# Patient Record
Sex: MALE | Race: WHITE | NOT HISPANIC OR LATINO | Employment: FULL TIME | URBAN - METROPOLITAN AREA
[De-identification: names, ages, dates, MRNs, and addresses within clinical notes are randomized per-mention and may not be internally consistent; named-entity substitution may affect disease eponyms.]

---

## 2018-05-31 ENCOUNTER — OFFICE VISIT - HEALTHEAST (OUTPATIENT)
Dept: FAMILY MEDICINE | Facility: CLINIC | Age: 43
End: 2018-05-31

## 2018-05-31 DIAGNOSIS — Z00.00 ROUTINE GENERAL MEDICAL EXAMINATION AT A HEALTH CARE FACILITY: ICD-10-CM

## 2018-05-31 DIAGNOSIS — D64.9 ANEMIA, UNSPECIFIED TYPE: ICD-10-CM

## 2018-05-31 DIAGNOSIS — K90.0 CELIAC DISEASE: ICD-10-CM

## 2018-05-31 DIAGNOSIS — I49.1 SUPRAVENTRICULAR PREMATURE BEATS: ICD-10-CM

## 2018-05-31 LAB
CHOLEST SERPL-MCNC: 223 MG/DL
ERYTHROCYTE [DISTWIDTH] IN BLOOD BY AUTOMATED COUNT: 12.4 % (ref 11–14.5)
FASTING STATUS PATIENT QL REPORTED: YES
FASTING STATUS PATIENT QL REPORTED: YES
GLUCOSE BLD-MCNC: 81 MG/DL (ref 70–125)
HCT VFR BLD AUTO: 41.8 % (ref 40–54)
HDLC SERPL-MCNC: 45 MG/DL
HGB BLD-MCNC: 14.1 G/DL (ref 14–18)
LDLC SERPL CALC-MCNC: 155 MG/DL
MCH RBC QN AUTO: 27.3 PG (ref 27–34)
MCHC RBC AUTO-ENTMCNC: 33.7 G/DL (ref 32–36)
MCV RBC AUTO: 81 FL (ref 80–100)
PLATELET # BLD AUTO: 219 THOU/UL (ref 140–440)
PMV BLD AUTO: 7.2 FL (ref 7–10)
RBC # BLD AUTO: 5.17 MILL/UL (ref 4.4–6.2)
TRIGL SERPL-MCNC: 114 MG/DL
WBC: 4.5 THOU/UL (ref 4–11)

## 2018-05-31 ASSESSMENT — MIFFLIN-ST. JEOR: SCORE: 1781.72

## 2018-06-04 LAB
GLIADIN IGA SER-ACNC: 2.5 U/ML
GLIADIN IGG SER-ACNC: 0.4 U/ML
IGA SERPL-MCNC: 299 MG/DL (ref 65–400)
TTG IGA SER-ACNC: 0.3 U/ML
TTG IGG SER-ACNC: <0.6 U/ML

## 2018-06-12 ENCOUNTER — COMMUNICATION - HEALTHEAST (OUTPATIENT)
Dept: FAMILY MEDICINE | Facility: CLINIC | Age: 43
End: 2018-06-12

## 2018-10-02 ENCOUNTER — COMMUNICATION - HEALTHEAST (OUTPATIENT)
Dept: FAMILY MEDICINE | Facility: CLINIC | Age: 43
End: 2018-10-02

## 2018-10-19 ENCOUNTER — RECORDS - HEALTHEAST (OUTPATIENT)
Dept: ADMINISTRATIVE | Facility: OTHER | Age: 43
End: 2018-10-19

## 2019-06-06 ENCOUNTER — OFFICE VISIT - HEALTHEAST (OUTPATIENT)
Dept: FAMILY MEDICINE | Facility: CLINIC | Age: 44
End: 2019-06-06

## 2019-06-06 DIAGNOSIS — N48.30 PAINFUL PENILE ERECTION: ICD-10-CM

## 2019-06-06 DIAGNOSIS — E78.00 HYPERCHOLESTEROLEMIA: ICD-10-CM

## 2019-06-06 DIAGNOSIS — D64.9 ANEMIA, UNSPECIFIED TYPE: ICD-10-CM

## 2019-06-06 DIAGNOSIS — Z00.00 ROUTINE GENERAL MEDICAL EXAMINATION AT A HEALTH CARE FACILITY: ICD-10-CM

## 2019-06-06 LAB
CHOLEST SERPL-MCNC: 191 MG/DL
FASTING STATUS PATIENT QL REPORTED: YES
HDLC SERPL-MCNC: 45 MG/DL
HGB BLD-MCNC: 13.8 G/DL (ref 14–18)
LDLC SERPL CALC-MCNC: 134 MG/DL
TRIGL SERPL-MCNC: 61 MG/DL

## 2019-06-06 ASSESSMENT — MIFFLIN-ST. JEOR: SCORE: 1750.81

## 2020-10-13 ENCOUNTER — RECORDS - HEALTHEAST (OUTPATIENT)
Dept: ADMINISTRATIVE | Facility: OTHER | Age: 45
End: 2020-10-13

## 2020-10-19 ENCOUNTER — COMMUNICATION - HEALTHEAST (OUTPATIENT)
Dept: FAMILY MEDICINE | Facility: CLINIC | Age: 45
End: 2020-10-19

## 2020-10-19 ENCOUNTER — OFFICE VISIT - HEALTHEAST (OUTPATIENT)
Dept: FAMILY MEDICINE | Facility: CLINIC | Age: 45
End: 2020-10-19

## 2020-10-19 DIAGNOSIS — D22.4 ATYPICAL NEVUS OF NECK: ICD-10-CM

## 2020-10-19 DIAGNOSIS — M76.62 ACHILLES TENDINITIS OF BOTH LOWER EXTREMITIES: ICD-10-CM

## 2020-10-19 DIAGNOSIS — M76.61 ACHILLES TENDINITIS OF BOTH LOWER EXTREMITIES: ICD-10-CM

## 2020-10-19 DIAGNOSIS — D64.9 ANEMIA, UNSPECIFIED TYPE: ICD-10-CM

## 2020-10-19 DIAGNOSIS — Z00.00 ROUTINE GENERAL MEDICAL EXAMINATION AT A HEALTH CARE FACILITY: ICD-10-CM

## 2020-10-19 DIAGNOSIS — H81.10 BENIGN PAROXYSMAL POSITIONAL VERTIGO, UNSPECIFIED LATERALITY: ICD-10-CM

## 2020-10-19 DIAGNOSIS — K90.0 CELIAC DISEASE: ICD-10-CM

## 2020-10-19 LAB
CHOLEST SERPL-MCNC: 212 MG/DL
FASTING STATUS PATIENT QL REPORTED: YES
HDLC SERPL-MCNC: 44 MG/DL
HGB BLD-MCNC: 14.2 G/DL (ref 14–18)
HIV 1+2 AB+HIV1 P24 AG SERPL QL IA: NEGATIVE
LDLC SERPL CALC-MCNC: 148 MG/DL
TRIGL SERPL-MCNC: 99 MG/DL

## 2020-10-19 ASSESSMENT — MIFFLIN-ST. JEOR: SCORE: 1794.76

## 2021-01-15 ENCOUNTER — RECORDS - HEALTHEAST (OUTPATIENT)
Dept: ADMINISTRATIVE | Facility: OTHER | Age: 46
End: 2021-01-15

## 2021-02-01 ENCOUNTER — COMMUNICATION - HEALTHEAST (OUTPATIENT)
Dept: FAMILY MEDICINE | Facility: CLINIC | Age: 46
End: 2021-02-01
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
## 2021-06-01 VITALS — HEIGHT: 72 IN | WEIGHT: 189.31 LBS | BODY MASS INDEX: 25.64 KG/M2

## 2021-06-02 VITALS — HEIGHT: 72 IN | BODY MASS INDEX: 24.72 KG/M2 | WEIGHT: 182.5 LBS

## 2021-06-04 VITALS
BODY MASS INDEX: 26.03 KG/M2 | OXYGEN SATURATION: 99 % | WEIGHT: 192.19 LBS | SYSTOLIC BLOOD PRESSURE: 110 MMHG | HEART RATE: 76 BPM | DIASTOLIC BLOOD PRESSURE: 70 MMHG | TEMPERATURE: 97.6 F | HEIGHT: 72 IN
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
## 2021-06-12 NOTE — PATIENT INSTRUCTIONS - HE
Consider adding a heel lift in your shoes to help reduce pressure on your Achilles tendon insertion on your heel.  Do some gentle stretches of your Achilles tendon twice daily.  Consider a new pair of supportive shoes.  Consider physical therapy.  Let us know if not improving.    I recommend scheduling a visit with dermatology.  Check to see who is covered in your insurance network.  I often refer to [facility].

## 2021-06-18 NOTE — PROGRESS NOTES
Assessment/Plan:     1. Routine general medical examination at a health care facility  Encouraged healthy lifestyle habits including regular exercise, healthy eating habits, and adequate calcium and vitamin D intake.  Will obtain fasting glucose and fasting lipids.  Up-to-date with routine vaccinations.  He is not due for any cancer screening.  - Glucose  - Lipid Cascade FASTING    2. Gluten Enteropathy  Advised moderation of gluten.  Consider complete elimination diet.  Will check hemoglobin.  - HM2(CBC w/o Differential)  - Celiac(Gluten)Antibody Panel ($$$)    3. Atrial Premature Complex  Rare symptoms, doing well.    4. Anemia, unspecified type  We will obtain follow-up hemoglobin today.  - HM2(CBC w/o Differential)         Subjective:     Sandoval Amaro is a 42 y.o. male who presents for an annual exam.  He has been doing well over the past year.  He notes a history of food related allergies and would like to discuss it.  Notes that if he has any dairy he gets an upset stomach or diarrhea, primarily occurs with milk and ice cream, able to tolerate yogurt well most of the time.  Notes that Weller peppers tend to cause lip swelling and hives.  Gluten, especially more than one slice of bread or more than 1 beer or any Posta, tends to cause significant pain in his get that radiates into the back of his legs.  Response can be variable.  He is wondering if additional testing for any of these would be helpful.  We have noted chronic slight anemia in the past.  He has never been screened for gluten enteropathy with blood tests and is interested in doing so.  He has not had any symptoms from previous premature atrial complexes.  This is been inactive.    His father has struggled over the past year with C. difficile colitis, is doing a bit better now but this was a struggle for patient and his family.    He is , 2 children ages 6 and 8.  Exercising by biking and walking his dogs.  Overall feels his diet is  healthy.      Healthy Habits:   Healthy Diet: Yes  Regular Exercise: Yes  Sunscreen Use: Yes  Dental Visits Regularly: Yes  Seat Belt: Yes    Health Maintenance reviewed:  Lipid Profile: needs  Glucose Screen: needs  Colonoscopy: N/A      Immunization History   Administered Date(s) Administered     Hep B, historic 06/17/2009, 05/11/2011     Td,adult,historic,unspecified 01/07/2009     Tdap 01/07/2009     Immunization status: up to date and documented.    No current outpatient prescriptions on file.     No current facility-administered medications for this visit.      No past medical history on file.  No past surgical history on file.  Green pepper and Erythromycin base  Family History   Problem Relation Age of Onset     Hyperlipidemia Mother      Arthritis Mother      Skin cancer Maternal Grandmother      Cancer Maternal Grandmother      skin     Lung disease Maternal Grandfather      asbestosis     Heart disease Paternal Grandmother      Alcohol abuse Paternal Grandfather      Diabetes Neg Hx      Social History     Social History     Marital status:      Spouse name: N/A     Number of children: N/A     Years of education: N/A     Occupational History     Not on file.     Social History Main Topics     Smoking status: Never Smoker     Smokeless tobacco: Never Used     Alcohol use 0.6 - 1.8 oz/week     1 - 3 Cans of beer per week     Drug use: No     Sexual activity: Not on file     Other Topics Concern     Not on file     Social History Narrative       Review of Systems  General:  Denies problem  Eyes: Denies problem  Ears/Nose/Throat: Denies problem  Cardiovascular: Denies problem  Respiratory:  Denies problem  Gastrointestinal:  Denies problem   Genitourinary: Denies problem  Musculoskeletal:  Denies problem  Skin: Denies problem  Neurologic: Denies problem  Psychiatric: Denies problem  Endocrine: Denies problem  Heme/Lymphatic: Denies problem   Allergic/Immunologic: Denies problem            Objective:         Vitals:    05/31/18 0713   BP: 110/70   Pulse: 82   Temp: 98.2  F (36.8  C)   TempSrc: Oral   SpO2: 99%   Weight: 189 lb 5 oz (85.9 kg)   Height: 6' (1.829 m)     Body mass index is 25.68 kg/(m^2).    Physical Exam:  General Appearance: Alert, pleasant, appears stated age  Head: Normocephalic, without obvious abnormality  Eyes: PERRL, conjunctiva/corneas clear, EOM's intact  Ears: Normal TM's and external ear canals, both ears  Nose: Nares normal, septum midline,mucosa normal, no drainage  Throat: Lips, mucosa, and tongue normal; teeth and gums normal; oropharynx is clear  Neck: Supple,without lymphadenopathy or thyromegally  Lungs: Clear to auscultation bilaterally, respirations unlabored  Heart: Regular rate and rhythm, no murmur   Abdomen: Soft, non-tender, no masses, no organomegaly  Genitourinary: Testes of normal size without masses, no inguinal hernias  Extremities: Extremities with strong and symmetric pulses, no cyanosis or edema  Skin: Skin color, texture normal, no rashes or lesions  Neurologic: Normal              This note has been dictated using voice recognition software. Any grammatical or context distortions are unintentional and inherent to the the software.

## 2021-09-25 ENCOUNTER — HEALTH MAINTENANCE LETTER (OUTPATIENT)
Age: 46
End: 2021-09-25

## 2021-11-03 ENCOUNTER — OFFICE VISIT (OUTPATIENT)
Dept: FAMILY MEDICINE | Facility: CLINIC | Age: 46
End: 2021-11-03
Payer: COMMERCIAL

## 2021-11-03 VITALS
DIASTOLIC BLOOD PRESSURE: 70 MMHG | SYSTOLIC BLOOD PRESSURE: 110 MMHG | HEIGHT: 72 IN | HEART RATE: 67 BPM | OXYGEN SATURATION: 98 % | RESPIRATION RATE: 16 BRPM | WEIGHT: 190.6 LBS | BODY MASS INDEX: 25.81 KG/M2 | TEMPERATURE: 98.2 F

## 2021-11-03 DIAGNOSIS — D64.9 ANEMIA, UNSPECIFIED TYPE: ICD-10-CM

## 2021-11-03 DIAGNOSIS — K90.0 CELIAC DISEASE: ICD-10-CM

## 2021-11-03 DIAGNOSIS — Z00.00 ROUTINE PHYSICAL EXAMINATION: Primary | ICD-10-CM

## 2021-11-03 PROCEDURE — 99396 PREV VISIT EST AGE 40-64: CPT | Performed by: FAMILY MEDICINE

## 2021-11-03 ASSESSMENT — MIFFLIN-ST. JEOR: SCORE: 1782.56

## 2021-11-03 NOTE — PROGRESS NOTES
Assessment/Plan:     Routine physical examination  Encouraged healthy lifestyle habits including regular exercise, healthy eating habits, and adequate calcium and vitamin D intake.  He is already received influenza vaccine.  Is not yet eligible for third dose of Covid vaccine.  Information provided regarding advance healthcare directives.  Lipids looked okay last year, not fasting today, will defer until next year.  No risk factors for diabetes.    Celiac disease  Encouraged continued gluten-free diet.  Consider probiotic such as Culturelle or Florastor.    Anemia, unspecified type  This remains asymptomatic.  He is getting a good variety in his diet, is not identifying any blood loss, lightheadedness, energy level changes, therefore we elect to defer reassessment of this until next year.    There are no Patient Instructions on file for this visit.     Return in about 1 year (around 11/3/2022) for Annual Preventive Care Visit.         Subjective:     Sandoval Amaro is a 46 year old male who presents for an annual exam.     He has been doing well over the past year with no changes in his health.  He has been strict with gluten-free and lactose-free diet over the last 5 weeks, not yet noting improvement in his gut health except from avoidance of lactose which he has been doing for several years now.  History of anemia thought to be related to celiac, denies any new fatigue, lightheadedness, or other new symptoms.  Denies nocturia, hesitancy, or incomplete bladder emptying.  Exercising regularly with rowing machine.  Sleeping well.  Eating healthy.  He has been working from home and enjoying this.    Healthy Habits:     Getting at least 3 servings of Calcium per day:  Yes    Bi-annual eye exam:  Yes    Dental care twice a year:  Yes    Sleep apnea or symptoms of sleep apnea:  None    Diet:  Gluten-free/reduced and Other    Frequency of exercise:  6-7 days/week    Duration of exercise:  30-45 minutes    Taking  medications regularly:  Yes    Medication side effects:  Not applicable    PHQ-2 Total Score: 0    Additional concerns today:  No       Healthy Habits:   Healthy Diet: Yes  Regular Exercise: Yes  Sunscreen Use: No  Dental Visits Regularly: Yes  Seat Belt: Yes    Health Maintenance reviewed:  Lipid Profile: Overall okay with  last year  Glucose Screen: Not indicated  Colonoscopy: No risk factors, not yet indicated      Immunization History   Administered Date(s) Administered     COVID-19,PF,Pfizer (12+ Yrs) 04/05/2021, 05/03/2021     HepB-Adult 06/17/2009, 05/11/2011     Influenza Vaccine IM > 6 months Valent IIV4 (Alfuria,Fluzone) 10/24/2017, 10/18/2019     Influenza Vaccine, 6+MO IM (QUADRIVALENT W/PRESERVATIVES) 10/13/2020     Influenza,INJ,MDCK,PF,Quad >4yrs 10/21/2021     TD (ADULT, 7+) 06/06/2019     Td,adult,historic,unspecified 01/07/2009     Tdap (Adacel,Boostrix) 01/07/2009     Immunization status: Up-to-date    No current outpatient medications on file.     Past Medical History:   Diagnosis Date     NO ACTIVE PROBLEMS      Past Surgical History:   Procedure Laterality Date     LASIK  12/06     Green pepper [capsicum] and Erythromycin base [erythromycin]  Family History   Problem Relation Age of Onset     Neurologic Disorder Mother         Intermittent vertigo     Cancer Maternal Grandmother         skin     Respiratory Maternal Grandfather         Asbestos     Alcohol/Drug Paternal Grandfather         Alcohol     Hyperlipidemia Mother      Arthritis Mother      Skin Cancer Maternal Grandmother      LUNG DISEASE Maternal Grandfather         asbestosis     Heart Disease Paternal Grandmother      Alcoholism Paternal Grandfather      Atrial fibrillation Father      Atrial fibrillation Brother      Diabetes No family hx of      Social History     Socioeconomic History     Marital status:      Spouse name: Not on file     Number of children: Not on file     Years of education: Not on file      Highest education level: Not on file   Occupational History     Not on file   Tobacco Use     Smoking status: Never Smoker     Smokeless tobacco: Never Used   Substance and Sexual Activity     Alcohol use: Yes     Alcohol/week: 1.0 - 2.0 standard drinks     Drug use: No     Sexual activity: Yes     Partners: Female   Other Topics Concern     Not on file   Social History Narrative     Not on file     Social Determinants of Health     Financial Resource Strain:      Difficulty of Paying Living Expenses:    Food Insecurity:      Worried About Running Out of Food in the Last Year:      Ran Out of Food in the Last Year:    Transportation Needs:      Lack of Transportation (Medical):      Lack of Transportation (Non-Medical):    Physical Activity:      Days of Exercise per Week:      Minutes of Exercise per Session:    Stress:      Feeling of Stress :    Social Connections:      Frequency of Communication with Friends and Family:      Frequency of Social Gatherings with Friends and Family:      Attends Mandaen Services:      Active Member of Clubs or Organizations:      Attends Club or Organization Meetings:      Marital Status:    Intimate Partner Violence:      Fear of Current or Ex-Partner:      Emotionally Abused:      Physically Abused:      Sexually Abused:        Review of Systems  General:  Denies problem  Eyes: Denies problem  Ears/Nose/Throat: Denies problem  Cardiovascular: Denies problem  Respiratory:  Denies problem  Gastrointestinal:  Denies problem   Genitourinary: Denies problem  Musculoskeletal:  Denies problem  Skin: Denies problem  Neurologic: Denies problem  Psychiatric: Denies problem  Endocrine: Denies problem  Heme/Lymphatic: Denies problem   Allergic/Immunologic: Denies problem           Objective:        Vitals:    11/03/21 1114   BP: 110/70   Pulse: 67   Resp: 16   Temp: 98.2  F (36.8  C)   TempSrc: Oral   SpO2: 98%   Weight: 86.5 kg (190 lb 9.6 oz)   Height: 1.829 m (6')     Body mass index is  25.85 kg/m .    Physical Exam:  General Appearance: Alert, pleasant, appears stated age  Head: Normocephalic, without obvious abnormality  Eyes: PERRL, conjunctiva/corneas clear, EOM's intact  Ears: Normal TM's and external ear canals, both ears  Nose: Nares normal, septum midline,mucosa normal, no drainage  Throat: Lips, mucosa, and tongue normal; teeth and gums normal; oropharynx is clear  Neck: Supple,without lymphadenopathy or thyromegally  Lungs: Clear to auscultation bilaterally, respirations unlabored  Heart: Regular rate and rhythm, no murmur   Abdomen: Soft, non-tender, no masses, no organomegaly  Extremities: Extremities with strong and symmetric pulses, no cyanosis or edema  Skin: Skin color, texture normal, no rashes or lesions  Neurologic: Normal              This note has been dictated using voice recognition software. Any grammatical or context distortions are unintentional and inherent to the the software.

## 2021-11-03 NOTE — PATIENT INSTRUCTIONS
Consider a probiotic such as Culturelle, Florastor, or a health food store supplement that is gluten and lactose-free.

## 2022-11-09 ENCOUNTER — OFFICE VISIT (OUTPATIENT)
Dept: FAMILY MEDICINE | Facility: CLINIC | Age: 47
End: 2022-11-09
Payer: COMMERCIAL

## 2022-11-09 VITALS
TEMPERATURE: 98 F | OXYGEN SATURATION: 98 % | SYSTOLIC BLOOD PRESSURE: 98 MMHG | WEIGHT: 189.4 LBS | HEART RATE: 76 BPM | HEIGHT: 72 IN | DIASTOLIC BLOOD PRESSURE: 60 MMHG | BODY MASS INDEX: 25.65 KG/M2 | RESPIRATION RATE: 18 BRPM

## 2022-11-09 DIAGNOSIS — D64.9 ANEMIA, UNSPECIFIED TYPE: ICD-10-CM

## 2022-11-09 DIAGNOSIS — K90.0 CELIAC DISEASE: ICD-10-CM

## 2022-11-09 DIAGNOSIS — Z00.00 ROUTINE PHYSICAL EXAMINATION: Primary | ICD-10-CM

## 2022-11-09 DIAGNOSIS — Z12.11 SCREEN FOR COLON CANCER: ICD-10-CM

## 2022-11-09 PROCEDURE — 99396 PREV VISIT EST AGE 40-64: CPT | Performed by: FAMILY MEDICINE

## 2022-11-09 ASSESSMENT — ENCOUNTER SYMPTOMS
DIARRHEA: 0
PARESTHESIAS: 0
HEMATURIA: 0
ABDOMINAL PAIN: 0
FEVER: 0
NERVOUS/ANXIOUS: 0
WEAKNESS: 0
ARTHRALGIAS: 0
HEARTBURN: 0
NAUSEA: 0
SORE THROAT: 0
MYALGIAS: 0
CHILLS: 0
DYSURIA: 0
EYE PAIN: 0
JOINT SWELLING: 0
COUGH: 0
SHORTNESS OF BREATH: 0
PALPITATIONS: 0
HEMATOCHEZIA: 0
DIZZINESS: 0
CONSTIPATION: 0
FREQUENCY: 0
HEADACHES: 0

## 2022-11-09 ASSESSMENT — PAIN SCALES - GENERAL: PAINLEVEL: NO PAIN (0)

## 2022-11-09 NOTE — PROGRESS NOTES
Assessment/Plan:     Routine physical examination  Encouraged healthy lifestyle habits including regular exercise, healthy eating habits, and adequate calcium and vitamin D intake.  Up-to-date with immunizations including COVID by valent booster.  Consider hepatitis B vaccine series.  Discussed colon cancer screening, he is agreeable to Cologuard.    Celiac disease  He continues to avoid gluten with good effect.    Anemia, unspecified type  This is mild, likely related to celiac.  We elect not to recheck this today but will consider at future visit.    Screen for colon cancer  - COLOGUARD([employer] SCI[employer])     There are no Patient Instructions on file for this visit.     No follow-ups on file.         Subjective:     Sandoval Amaro is a 47 year old male who presents for an annual exam.     Doing well over the past year without changes in health.  Mostly gluten and lactose-free, sometimes will have chocolate and take Lactaid with it.  History of mild anemia related to this, he has not had symptoms of this.  He would like me to look at a mole on his right neck.    Eating healthy.  Exercising on a rowing machine for 20 minutes most days of the week, also walking his dog.  Adequate sleep.  Good stress management.    Healthy Habits:     Getting at least 3 servings of Calcium per day:  Yes    Bi-annual eye exam:  Yes    Dental care twice a year:  Yes    Sleep apnea or symptoms of sleep apnea:  None    Diet:  Gluten-free/reduced and Other    Frequency of exercise:  6-7 days/week    Duration of exercise:  30-45 minutes    Taking medications regularly:  Yes    Medication side effects:  Not applicable    PHQ-2 Total Score: 0    Additional concerns today:  Yes       Healthy Habits:   Healthy Diet: Yes  Regular Exercise: Yes  Sunscreen Use: Yes  Dental Visits Regularly: Yes  Seat Belt: Yes    Health Maintenance reviewed:  Colonoscopy: No previous      Immunization History   Administered Date(s) Administered      COVID-19,PF,Moderna 11/28/2021     COVID-19,PF,Pfizer (12+ Yrs) 04/05/2021, 05/03/2021     COVID-19,PF,Pfizer 12+ YRS BIVALENT Booster 09/30/2022     HepB-Adult 06/17/2009, 05/11/2011     Influenza Vaccine IM > 6 months Valent IIV4 (Alfuria,Fluzone) 10/24/2017, 10/18/2019     Influenza Vaccine, 6+MO IM (QUADRIVALENT W/PRESERVATIVES) 10/13/2020     Influenza,INJ,MDCK,PF,Quad >6mo(Flucelvax-RMG) 10/21/2021, 09/30/2022     TD (ADULT, 7+) 06/06/2019     Td,adult,historic,unspecified 01/07/2009     Tdap (Adacel,Boostrix) 01/07/2009     Immunization status: Up-to-date.  Reports receiving COVID by valent booster.    No current outpatient medications on file.     Past Medical History:   Diagnosis Date     NO ACTIVE PROBLEMS      Past Surgical History:   Procedure Laterality Date     LASIK  12/06     Green pepper [capsicum] and Erythromycin base [erythromycin]  Family History   Problem Relation Age of Onset     Neurologic Disorder Mother         Intermittent vertigo     Cancer Maternal Grandmother         skin     Respiratory Maternal Grandfather         Asbestos     Alcohol/Drug Paternal Grandfather         Alcohol     Hyperlipidemia Mother      Arthritis Mother      Skin Cancer Maternal Grandmother      LUNG DISEASE Maternal Grandfather         asbestosis     Heart Disease Paternal Grandmother      Alcoholism Paternal Grandfather      Atrial fibrillation Father      Atrial fibrillation Brother      Diabetes No family hx of      Social History     Socioeconomic History     Marital status:      Spouse name: Not on file     Number of children: Not on file     Years of education: Not on file     Highest education level: Not on file   Occupational History     Not on file   Tobacco Use     Smoking status: Never     Smokeless tobacco: Never   Vaping Use     Vaping Use: Never used   Substance and Sexual Activity     Alcohol use: Yes     Alcohol/week: 1.0 - 2.0 standard drink     Drug use: No     Sexual activity: Yes      Partners: Female   Other Topics Concern     Not on file   Social History Narrative     Not on file     Social Determinants of Health     Financial Resource Strain: Not on file   Food Insecurity: Not on file   Transportation Needs: Not on file   Physical Activity: Not on file   Stress: Not on file   Social Connections: Not on file   Intimate Partner Violence: Not on file   Housing Stability: Not on file       Review of Systems  General:  Denies problem  Eyes: Denies problem  Ears/Nose/Throat: Denies problem  Cardiovascular: Denies problem  Respiratory:  Denies problem  Gastrointestinal:  Denies problem   Genitourinary: Denies problem  Musculoskeletal:  Denies problem  Skin: Denies problem  Neurologic: Denies problem  Psychiatric: Denies problem  Endocrine: Denies problem  Heme/Lymphatic: Denies problem   Allergic/Immunologic: Denies problem           Objective:        Vitals:    11/09/22 1535   BP: 98/60   Pulse: 76   Resp: 18   Temp: 98  F (36.7  C)   TempSrc: Oral   SpO2: 98%   Weight: 85.9 kg (189 lb 6.4 oz)   Height: 1.829 m (6')   PainSc: No Pain (0)     Body mass index is 25.69 kg/m .    Physical Exam:  General Appearance: Alert, pleasant, appears stated age  Head: Normocephalic, without obvious abnormality  Eyes: PERRL, conjunctiva/corneas clear, EOM's intact  Ears: Normal TM's and external ear canals, both ears  Nose: Nares normal, septum midline,mucosa normal, no drainage  Throat: Lips, mucosa, and tongue normal; teeth and gums normal; oropharynx is clear  Neck: Supple,without lymphadenopathy or thyromegally  Lungs: Clear to auscultation bilaterally, respirations unlabored  Heart: Regular rate and rhythm, no murmur   Abdomen: Soft, non-tender, no masses, no organomegaly  Extremities: Extremities with strong and symmetric pulses, no cyanosis or edema  Skin: Skin color, texture normal, no rashes or lesions; benign seborrheic keratoses at neck.  Neurologic: Normal              This note has been dictated using  voice recognition software. Any grammatical or context distortions are unintentional and inherent to the the software.    Answers for HPI/ROS submitted by the patient on 11/9/2022  abdominal pain: No  Blood in stool: No  Blood in urine: No  chest pain: No  chills: No  congestion: No  constipation: No  cough: No  diarrhea: No  dizziness: No  ear pain: No  eye pain: No  nervous/anxious: No  fever: No  frequency: No  genital sores: No  headaches: No  hearing loss: No  heartburn: No  arthralgias: No  joint swelling: No  peripheral edema: No  mood changes: No  myalgias: No  nausea: No  dysuria: No  palpitations: No  Skin sensation changes: No  sore throat: No  urgency: No  rash: No  shortness of breath: No  visual disturbance: No  weakness: No

## 2022-11-23 LAB — NONINV COLON CA DNA+OCC BLD SCRN STL QL: NEGATIVE

## 2023-10-28 ENCOUNTER — IMMUNIZATION (OUTPATIENT)
Dept: FAMILY MEDICINE | Facility: CLINIC | Age: 48
End: 2023-10-28
Payer: COMMERCIAL

## 2023-10-28 PROCEDURE — 90686 IIV4 VACC NO PRSV 0.5 ML IM: CPT

## 2023-10-28 PROCEDURE — 90471 IMMUNIZATION ADMIN: CPT

## 2023-10-28 PROCEDURE — 90480 ADMN SARSCOV2 VAC 1/ONLY CMP: CPT

## 2023-10-28 PROCEDURE — 91320 SARSCV2 VAC 30MCG TRS-SUC IM: CPT

## 2023-11-12 PROBLEM — D64.9 ANEMIA: Status: ACTIVE | Noted: 2023-11-12

## 2023-11-12 PROBLEM — K90.41 GLUTEN ENTEROPATHY: Status: ACTIVE | Noted: 2023-11-12

## 2023-11-12 PROBLEM — I49.1 ATRIAL PREMATURE COMPLEX: Status: ACTIVE | Noted: 2023-11-12

## 2023-11-15 ENCOUNTER — OFFICE VISIT (OUTPATIENT)
Dept: FAMILY MEDICINE | Facility: CLINIC | Age: 48
End: 2023-11-15
Payer: COMMERCIAL

## 2023-11-15 VITALS
SYSTOLIC BLOOD PRESSURE: 104 MMHG | HEIGHT: 72 IN | DIASTOLIC BLOOD PRESSURE: 62 MMHG | HEART RATE: 73 BPM | OXYGEN SATURATION: 100 % | WEIGHT: 188 LBS | BODY MASS INDEX: 25.47 KG/M2 | TEMPERATURE: 97.7 F | RESPIRATION RATE: 16 BRPM

## 2023-11-15 DIAGNOSIS — Z00.00 ROUTINE PHYSICAL EXAMINATION: Primary | ICD-10-CM

## 2023-11-15 DIAGNOSIS — M54.9 UPPER BACK PAIN: ICD-10-CM

## 2023-11-15 PROCEDURE — 99396 PREV VISIT EST AGE 40-64: CPT | Performed by: FAMILY MEDICINE

## 2023-11-15 ASSESSMENT — ENCOUNTER SYMPTOMS
PALPITATIONS: 0
NERVOUS/ANXIOUS: 0
DYSURIA: 0
JOINT SWELLING: 0
CHILLS: 0
DIZZINESS: 0
HEADACHES: 0
MYALGIAS: 0
DIARRHEA: 0
FREQUENCY: 0
NAUSEA: 0
HEMATURIA: 0
HEARTBURN: 0
ABDOMINAL PAIN: 0
ARTHRALGIAS: 0
PARESTHESIAS: 0
SORE THROAT: 0
CONSTIPATION: 0
EYE PAIN: 0
COUGH: 0
SHORTNESS OF BREATH: 0
FEVER: 0
WEAKNESS: 0
HEMATOCHEZIA: 0

## 2023-11-15 ASSESSMENT — PAIN SCALES - GENERAL: PAINLEVEL: MILD PAIN (3)

## 2023-11-15 NOTE — PROGRESS NOTES
Assessment/Plan:     Routine physical examination  Encouraged healthy lifestyle habits including regular exercise, healthy eating habits, and adequate calcium and vitamin D intake.  He declines screening for hepatitis C.  He has had recent screening for life insurance, believes he had cholesterol labs performed, will ask that he forward these to us.  He believes he has completed the hepatitis B vaccine series.  Otherwise up-to-date with immunizations.  Interested in vasectomy, we discussed options in the area.    Upper back pain  Encouraged him consideration of ergonomics assessment for his workstation, consideration of sit/stand desk.  May pursue massage therapy or chiropractic care.  I also offer and recommend physical therapy, he may have this available through work and will let me know if he needs an order.    Patient Instructions   Dr. Vickey Merlos ([place]) and Dr. Pratik Diamond ([place]) excellent family physicians who are able to do vasectomies in the office.  You may schedule a visit with him to discuss vasectomy.  [facility] would be an alternative option.      Preventive Health Recommendations  Male Ages 40 to 49    Yearly exam:             See your health care provider every year in order to  o   Review health changes.   o   Discuss preventive care.    o   Review your medicines if your doctor has prescribed any.  You should be tested each year for STDs (sexually transmitted diseases) if you re at risk.   Have a cholesterol test every 5 years.   Have a colonoscopy (test for colon cancer) beginning at age 45.  Ask your provider about other options like a yearly FIT test or Cologuard test every 3 years (stool tests)   After age 45, have a diabetes test (fasting glucose). If you are at risk for diabetes, you should have this test every 3 years.    Talk with your health care provider about whether or not a prostate cancer screening test (PSA) is right for you.    Shots: Get a flu shot each year. Get  a tetanus shot every 10 years.     Nutrition:  Eat at least 5 servings of fruits and vegetables daily.   Eat whole-grain bread, whole-wheat pasta and brown rice instead of white grains and rice.   Get adequate Calcium and Vitamin D.     Lifestyle  Exercise for at least 150 minutes a week (30 minutes a day, 5 days a week). This will help you control your weight and prevent disease.   Limit alcohol to one drink per day.   No smoking.   Wear sunscreen to prevent skin cancer.   See your dentist every six months for an exam and cleaning.           No follow-ups on file.         Subjective:     Sandoval Amaro is a 48 year old male who presents for an annual exam.     Seen today for routine preventive care visit.  He has been having some difficulties with upper back pain, treated successfully with acupuncture in 2019, less helpful in 2021, and more recently was not helpful.  He has had massage therapy recommended to him.  Interested in my thoughts.  He has no associated symptoms including upper extremity symptoms.  He is doing well with his celiac.  Recent life insurance assessment in the summer months, no identified abnormalities.  Eating healthy.  Exercising by rowing 4 days/week.  New puppy.  Working from home.  Daughters are now 11 and 13 years old.  Interested in vasectomy.    Healthy Habits:     Getting at least 3 servings of Calcium per day:  Yes    Bi-annual eye exam:  Yes    Dental care twice a year:  Yes    Sleep apnea or symptoms of sleep apnea:  None    Diet:  Gluten-free/reduced    Frequency of exercise:  4-5 days/week    Duration of exercise:  45-60 minutes    Taking medications regularly:  Yes    Medication side effects:  None    Additional concerns today:  Yes       Healthy Habits:   Healthy Diet: Yes  Regular Exercise: Yes  Sunscreen Use: Yes  Dental Visits Regularly: Yes  Seat Belt: Yes    Health Maintenance reviewed:  Colonoscopy: Negative Cologuard 11/17/2022      Immunization History   Administered  Date(s) Administered    COVID-19 12+ (2023-24) (Pfizer) 10/28/2023    COVID-19 Bivalent 12+ (Pfizer) 09/30/2022    COVID-19 MONOVALENT 12+ (Pfizer) 04/05/2021, 05/03/2021    COVID-19 Monovalent 18+ (Moderna) 11/28/2021    Hepatitis B, Adult 06/17/2009, 05/11/2011    Influenza Vaccine >6 months (Alfuria,Fluzone) 10/24/2017, 10/18/2019, 10/28/2023    Influenza Vaccine, 6+MO IM (QUADRIVALENT W/PRESERVATIVES) 10/13/2020    Influenza,INJ,MDCK,PF,Quad >6mo(Flucelvax) 10/21/2021, 09/30/2022    TD,PF 7+ (Tenivac) 06/06/2019    TDAP (Adacel,Boostrix) 01/07/2009    Td,adult,historic,unspecified 01/07/2009     Immunization status: Up-to-date    No current outpatient medications on file.     Past Medical History:   Diagnosis Date    NO ACTIVE PROBLEMS      Past Surgical History:   Procedure Laterality Date    LASIK  12/06     Green pepper [capsicum] and Erythromycin base [erythromycin]  Family History   Problem Relation Age of Onset    Neurologic Disorder Mother         Intermittent vertigo    Cancer Maternal Grandmother         skin    Respiratory Maternal Grandfather         Asbestos    Alcohol/Drug Paternal Grandfather         Alcohol    Hyperlipidemia Mother     Arthritis Mother     Skin Cancer Maternal Grandmother     LUNG DISEASE Maternal Grandfather         asbestosis    Heart Disease Paternal Grandmother     Alcoholism Paternal Grandfather     Atrial fibrillation Father     Atrial fibrillation Brother     Diabetes No family hx of      Social History     Socioeconomic History    Marital status:      Spouse name: Not on file    Number of children: Not on file    Years of education: Not on file    Highest education level: Not on file   Occupational History    Not on file   Tobacco Use    Smoking status: Never    Smokeless tobacco: Never   Vaping Use    Vaping Use: Never used   Substance and Sexual Activity    Alcohol use: Yes     Alcohol/week: 1.0 - 2.0 standard drink of alcohol    Drug use: No    Sexual activity:  Yes     Partners: Female   Other Topics Concern    Not on file   Social History Narrative    Not on file     Social Determinants of Health     Financial Resource Strain: Low Risk  (11/15/2023)    Financial Resource Strain     Within the past 12 months, have you or your family members you live with been unable to get utilities (heat, electricity) when it was really needed?: No   Food Insecurity: Low Risk  (11/15/2023)    Food Insecurity     Within the past 12 months, did you worry that your food would run out before you got money to buy more?: No     Within the past 12 months, did the food you bought just not last and you didn t have money to get more?: No   Transportation Needs: Low Risk  (11/15/2023)    Transportation Needs     Within the past 12 months, has lack of transportation kept you from medical appointments, getting your medicines, non-medical meetings or appointments, work, or from getting things that you need?: No   Physical Activity: Not on file   Stress: Not on file   Social Connections: Not on file   Interpersonal Safety: Low Risk  (11/15/2023)    Interpersonal Safety     Do you feel physically and emotionally safe where you currently live?: Yes     Within the past 12 months, have you been hit, slapped, kicked or otherwise physically hurt by someone?: No     Within the past 12 months, have you been humiliated or emotionally abused in other ways by your partner or ex-partner?: No   Housing Stability: Low Risk  (11/15/2023)    Housing Stability     Do you have housing? : Yes     Are you worried about losing your housing?: No       Review of Systems  General:  Denies problem  Eyes: Denies problem  Ears/Nose/Throat: Denies problem  Cardiovascular: Denies problem  Respiratory:  Denies problem  Gastrointestinal:  Denies problem   Genitourinary: Denies problem  Musculoskeletal:  Denies problem  Skin: Denies problem  Neurologic: Denies problem  Psychiatric: Denies problem  Endocrine: Denies  problem  Heme/Lymphatic: Denies problem   Allergic/Immunologic: Denies problem           Objective:        Vitals:    11/15/23 1526   BP: 104/62   Pulse: 73   Resp: 16   Temp: 97.7  F (36.5  C)   TempSrc: Oral   SpO2: 100%   Weight: 85.3 kg (188 lb)   Height: 1.829 m (6')   PainSc: Mild Pain (3)   PainLoc: Upper Back     Body mass index is 25.5 kg/m .    Physical Exam:  General Appearance: Alert, pleasant, appears stated age  Head: Normocephalic, without obvious abnormality  Eyes: PERRL, conjunctiva/corneas clear, EOM's intact  Ears: Normal TM's and external ear canals, both ears  Nose: Nares normal, septum midline,mucosa normal, no drainage  Throat: Lips, mucosa, and tongue normal; teeth and gums normal; oropharynx is clear  Neck: Supple,without lymphadenopathy or thyromegally  Lungs: Clear to auscultation bilaterally, respirations unlabored  Heart: Regular rate and rhythm, no murmur   Abdomen: Soft, non-tender, no masses, no organomegaly  Extremities: Extremities with strong and symmetric pulses, no cyanosis or edema  Skin: Skin color, texture normal, no rashes or lesions  Neurologic: Normal              This note has been dictated using voice recognition software. Any grammatical or context distortions are unintentional and inherent to the the software.

## 2023-11-15 NOTE — PATIENT INSTRUCTIONS
Dr. Vickey Merlos ([place]) and Dr. Pratik Diamond ([place]) excellent family physicians who are able to do vasectomies in the office.  You may schedule a visit with him to discuss vasectomy.  [facility] would be an alternative option.      Preventive Health Recommendations  Male Ages 40 to 49    Yearly exam:             See your health care provider every year in order to  o   Review health changes.   o   Discuss preventive care.    o   Review your medicines if your doctor has prescribed any.  You should be tested each year for STDs (sexually transmitted diseases) if you re at risk.   Have a cholesterol test every 5 years.   Have a colonoscopy (test for colon cancer) beginning at age 45.  Ask your provider about other options like a yearly FIT test or Cologuard test every 3 years (stool tests)   After age 45, have a diabetes test (fasting glucose). If you are at risk for diabetes, you should have this test every 3 years.    Talk with your health care provider about whether or not a prostate cancer screening test (PSA) is right for you.    Shots: Get a flu shot each year. Get a tetanus shot every 10 years.     Nutrition:  Eat at least 5 servings of fruits and vegetables daily.   Eat whole-grain bread, whole-wheat pasta and brown rice instead of white grains and rice.   Get adequate Calcium and Vitamin D.     Lifestyle  Exercise for at least 150 minutes a week (30 minutes a day, 5 days a week). This will help you control your weight and prevent disease.   Limit alcohol to one drink per day.   No smoking.   Wear sunscreen to prevent skin cancer.   See your dentist every six months for an exam and cleaning.

## 2024-10-18 ENCOUNTER — IMMUNIZATION (OUTPATIENT)
Dept: FAMILY MEDICINE | Facility: CLINIC | Age: 49
End: 2024-10-18
Payer: COMMERCIAL

## 2024-10-18 DIAGNOSIS — Z23 ENCOUNTER FOR IMMUNIZATION: Primary | ICD-10-CM

## 2024-10-18 PROCEDURE — 90471 IMMUNIZATION ADMIN: CPT

## 2024-10-18 PROCEDURE — 91320 SARSCV2 VAC 30MCG TRS-SUC IM: CPT

## 2024-10-18 PROCEDURE — 90656 IIV3 VACC NO PRSV 0.5 ML IM: CPT

## 2024-10-18 PROCEDURE — 99207 PR NO CHARGE NURSE ONLY: CPT

## 2024-10-18 PROCEDURE — 90480 ADMN SARSCOV2 VAC 1/ONLY CMP: CPT

## 2024-10-25 ENCOUNTER — OFFICE VISIT (OUTPATIENT)
Dept: FAMILY MEDICINE | Facility: CLINIC | Age: 49
End: 2024-10-25
Payer: COMMERCIAL

## 2024-10-25 VITALS
HEART RATE: 76 BPM | RESPIRATION RATE: 15 BRPM | SYSTOLIC BLOOD PRESSURE: 110 MMHG | HEIGHT: 72 IN | BODY MASS INDEX: 25.19 KG/M2 | DIASTOLIC BLOOD PRESSURE: 60 MMHG | OXYGEN SATURATION: 100 % | TEMPERATURE: 98.2 F | WEIGHT: 186 LBS

## 2024-10-25 DIAGNOSIS — Z30.09 ENCOUNTER FOR VASECTOMY COUNSELING: Primary | ICD-10-CM

## 2024-10-25 PROCEDURE — 99213 OFFICE O/P EST LOW 20 MIN: CPT | Performed by: FAMILY MEDICINE

## 2024-10-25 ASSESSMENT — PAIN SCALES - GENERAL: PAINLEVEL_OUTOF10: NO PAIN (0)
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
## 2024-11-15 ENCOUNTER — OFFICE VISIT (OUTPATIENT)
Dept: FAMILY MEDICINE | Facility: CLINIC | Age: 49
End: 2024-11-15
Payer: COMMERCIAL

## 2024-11-15 VITALS
HEIGHT: 72 IN | SYSTOLIC BLOOD PRESSURE: 118 MMHG | BODY MASS INDEX: 25.06 KG/M2 | TEMPERATURE: 97.6 F | RESPIRATION RATE: 15 BRPM | DIASTOLIC BLOOD PRESSURE: 64 MMHG | HEART RATE: 100 BPM | OXYGEN SATURATION: 98 % | WEIGHT: 185 LBS

## 2024-11-15 DIAGNOSIS — Z30.2 ENCOUNTER FOR STERILIZATION: Primary | ICD-10-CM

## 2024-11-15 ASSESSMENT — PAIN SCALES - GENERAL: PAINLEVEL_OUTOF10: NO PAIN (0)
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
## 2024-11-18 ENCOUNTER — OFFICE VISIT (OUTPATIENT)
Dept: FAMILY MEDICINE | Facility: CLINIC | Age: 49
End: 2024-11-18
Attending: FAMILY MEDICINE
Payer: COMMERCIAL

## 2024-11-18 VITALS
HEART RATE: 75 BPM | TEMPERATURE: 98.6 F | RESPIRATION RATE: 20 BRPM | OXYGEN SATURATION: 100 % | SYSTOLIC BLOOD PRESSURE: 120 MMHG | DIASTOLIC BLOOD PRESSURE: 79 MMHG | HEIGHT: 72 IN | BODY MASS INDEX: 25.19 KG/M2 | WEIGHT: 186 LBS

## 2024-11-18 DIAGNOSIS — Z13.1 SCREENING FOR DIABETES MELLITUS: ICD-10-CM

## 2024-11-18 DIAGNOSIS — D64.9 ANEMIA, UNSPECIFIED TYPE: ICD-10-CM

## 2024-11-18 DIAGNOSIS — Z00.00 ROUTINE PHYSICAL EXAMINATION: Primary | ICD-10-CM

## 2024-11-18 DIAGNOSIS — R15.2 FECAL URGENCY: ICD-10-CM

## 2024-11-18 PROBLEM — K90.41 GLUTEN ENTEROPATHY: Status: RESOLVED | Noted: 2023-11-12 | Resolved: 2024-11-18

## 2024-11-18 LAB
BASOPHILS # BLD AUTO: 0 10E3/UL (ref 0–0.2)
BASOPHILS NFR BLD AUTO: 0 %
EOSINOPHIL # BLD AUTO: 0.1 10E3/UL (ref 0–0.7)
EOSINOPHIL NFR BLD AUTO: 1 %
ERYTHROCYTE [DISTWIDTH] IN BLOOD BY AUTOMATED COUNT: 12.6 % (ref 10–15)
EST. AVERAGE GLUCOSE BLD GHB EST-MCNC: 123 MG/DL
HBA1C MFR BLD: 5.9 % (ref 0–5.6)
HCT VFR BLD AUTO: 41.7 % (ref 40–53)
HGB BLD-MCNC: 13.6 G/DL (ref 13.3–17.7)
IMM GRANULOCYTES # BLD: 0 10E3/UL
IMM GRANULOCYTES NFR BLD: 0 %
LYMPHOCYTES # BLD AUTO: 2.5 10E3/UL (ref 0.8–5.3)
LYMPHOCYTES NFR BLD AUTO: 39 %
MCH RBC QN AUTO: 27.1 PG (ref 26.5–33)
MCHC RBC AUTO-ENTMCNC: 32.6 G/DL (ref 31.5–36.5)
MCV RBC AUTO: 83 FL (ref 78–100)
MONOCYTES # BLD AUTO: 0.6 10E3/UL (ref 0–1.3)
MONOCYTES NFR BLD AUTO: 9 %
NEUTROPHILS # BLD AUTO: 3.3 10E3/UL (ref 1.6–8.3)
NEUTROPHILS NFR BLD AUTO: 50 %
PLATELET # BLD AUTO: 222 10E3/UL (ref 150–450)
RBC # BLD AUTO: 5.01 10E6/UL (ref 4.4–5.9)
WBC # BLD AUTO: 6.5 10E3/UL (ref 4–11)

## 2024-11-18 PROCEDURE — 80061 LIPID PANEL: CPT | Performed by: FAMILY MEDICINE

## 2024-11-18 PROCEDURE — 85025 COMPLETE CBC W/AUTO DIFF WBC: CPT | Performed by: FAMILY MEDICINE

## 2024-11-18 PROCEDURE — 99396 PREV VISIT EST AGE 40-64: CPT | Performed by: FAMILY MEDICINE

## 2024-11-18 PROCEDURE — 83036 HEMOGLOBIN GLYCOSYLATED A1C: CPT | Performed by: FAMILY MEDICINE

## 2024-11-18 PROCEDURE — 36415 COLL VENOUS BLD VENIPUNCTURE: CPT | Performed by: FAMILY MEDICINE

## 2024-11-18 PROCEDURE — 99213 OFFICE O/P EST LOW 20 MIN: CPT | Mod: 25 | Performed by: FAMILY MEDICINE

## 2024-11-18 PROCEDURE — 80053 COMPREHEN METABOLIC PANEL: CPT | Performed by: FAMILY MEDICINE

## 2024-11-18 PROCEDURE — 84443 ASSAY THYROID STIM HORMONE: CPT | Performed by: FAMILY MEDICINE

## 2024-11-18 RX ORDER — ACETAMINOPHEN 500 MG
500-1000 TABLET ORAL EVERY 6 HOURS PRN
COMMUNITY

## 2024-11-18 SDOH — HEALTH STABILITY: PHYSICAL HEALTH: ON AVERAGE, HOW MANY DAYS PER WEEK DO YOU ENGAGE IN MODERATE TO STRENUOUS EXERCISE (LIKE A BRISK WALK)?: 3 DAYS

## 2024-11-18 ASSESSMENT — PAIN SCALES - GENERAL: PAINLEVEL_OUTOF10: MODERATE PAIN (4)

## 2024-11-18 ASSESSMENT — SOCIAL DETERMINANTS OF HEALTH (SDOH): HOW OFTEN DO YOU GET TOGETHER WITH FRIENDS OR RELATIVES?: ONCE A WEEK

## 2024-11-18 NOTE — PROGRESS NOTES
"Preventive Care Visit  [facility]  Bertha Garcia MD, Family Medicine  Nov 18, 2024      Assessment & Plan     Routine physical examination  Encouraged healthy lifestyle habits including regular exercise, healthy eating habits, and adequate calcium and vitamin D intake.  Nonfasting, will check nonfasting lipids today.  Will screen for diabetes with A1c.  Up-to-date with colon cancer screening.  Up-to-date with immunizations.  - Lipid panel reflex to direct LDL Non-fasting; Future  - Lipid panel reflex to direct LDL Non-fasting    Screening for diabetes mellitus  Check A1c.  - Hemoglobin A1c; Future  - Hemoglobin A1c    Anemia, unspecified type  Resolved most recent evaluation, will check CBC today.  - CBC with Platelets & Differential; Future  - CBC with Platelets & Differential    Fecal urgency  Nonspecific finding.  Will check for thyroid dysfunction, electrolyte disturbances, kidney or liver abnormalities that may be contributing.  Will check for secondary anemia.  He is going to initiate a probiotic, trial a low FODMAPs diet and elimination diet to see if he can identify triggering factors.  Offered GI evaluation should symptoms persist despite dietary changes.  - TSH with free T4 reflex; Future  - CBC with Platelets & Differential; Future  - Comprehensive metabolic panel; Future  - TSH with free T4 reflex  - CBC with Platelets & Differential  - Comprehensive metabolic panel            BMI  Estimated body mass index is 25.06 kg/m  as calculated from the following:    Height as of this encounter: 1.835 m (6' 0.24\").    Weight as of this encounter: 84.4 kg (186 lb).       Counseling  Appropriate preventive services were addressed with this patient via screening, questionnaire, or discussion as appropriate for fall prevention, nutrition, physical activity, Tobacco-use cessation, social engagement, weight loss and cognition.  Checklist reviewing preventive services available has been given to " the patient.  Reviewed patient's diet, addressing concerns and/or questions.   He is at risk for lack of exercise and has been provided with information to increase physical activity for the benefit of his well-being.   He is at risk for psychosocial distress and has been provided with information to reduce risk.           Subjective   Sandoval is a 49 year old, presenting for the following:  Physical (Had vas on Friday and double check on that. ) and question (Double check on toe./Having gut problem, would like recommendation on what's irritating stomach. )        11/18/2024     2:40 PM   Additional Questions   Roomed by JENNIFER Cantrell          HPI  Struggles with fecal urgency, increased flatus, in general discomfort after eating.  No nausea, vomiting, diarrhea, mucus, or blood in stools.  This has been ongoing for several years.  He states that he had evaluation and was told he does not have celiac disease, has not noted wheat specifically to be a trigger.  Interested in elimination diet information.  In the past saw gastroenterology but the visit was very expensive and he did not find it helpful at the time.  He occasionally will have PACs, occasionally is notified of a fast heart rate but nothing ongoing and nothing symptomatic.  Status post vasectomy 11/15/2024, pain is improving, he would like me to take a look at the area.  He like me to look at a toe on his left foot, dropped some furniture on it about 8 or 9 months ago, toenail still appears abnormal.        11/18/2024   General Health   How would you rate your overall physical health? Good   Feel stress (tense, anxious, or unable to sleep) To some extent      (!) STRESS CONCERN      11/18/2024   Nutrition   Three or more servings of calcium each day? Yes   Diet: Gluten-free/reduced   How many servings of fruit and vegetables per day? (!) 2-3   How many sweetened beverages each day? 0-1            11/18/2024   Exercise   Days per week of moderate/strenous exercise 3  days            11/18/2024   Social Factors   Frequency of gathering with friends or relatives Once a week   Worry food won't last until get money to buy more No   Food not last or not have enough money for food? No   Do you have housing? (Housing is defined as stable permanent housing and does not include staying ouside in a car, in a tent, in an abandoned building, in an overnight shelter, or couch-surfing.) Yes   Are you worried about losing your housing? No   Lack of transportation? No   Unable to get utilities (heat,electricity)? No            11/18/2024   Dental   Dentist two times every year? Yes            11/18/2024   TB Screening   Were you born outside of the [place]? No            Today's PHQ-2 Score:       11/18/2024     2:34 PM   PHQ-2 ( 1999 [employer])   Q1: Little interest or pleasure in doing things 1    Q2: Feeling down, depressed or hopeless 0    PHQ-2 Score 1    Q1: Little interest or pleasure in doing things Several days   Q2: Feeling down, depressed or hopeless Not at all   PHQ-2 Score 1       Patient-reported           11/18/2024   Substance Use   Alcohol more than 3/day or more than 7/wk No   Do you use any other substances recreationally? No        Social History     Tobacco Use    Smoking status: Never    Smokeless tobacco: Never   Vaping Use    Vaping status: Never Used   Substance Use Topics    Alcohol use: Yes     Alcohol/week: 1.0 - 2.0 standard drink of alcohol    Drug use: No           11/18/2024   STI Screening   New sexual partner(s) since last STI/HIV test? No      ASCVD Risk   The 10-year ASCVD risk score (Yeni BUSH, et al., 2019) is: 3.5%    Values used to calculate the score:      Age: 49 years      Sex: Male      Is Non- : No      Diabetic: No      Tobacco smoker: No      Systolic Blood Pressure: 120 mmHg      Is BP treated: No      HDL Cholesterol: 44 mg/dL      Total Cholesterol: 212 mg/dL        11/18/2024   Contraception/Family Planning   Questions  about contraception or family planning No           Reviewed and updated as needed this visit by Provider                    Past Medical History:   Diagnosis Date    NO ACTIVE PROBLEMS      Past Surgical History:   Procedure Laterality Date    LASIK  12/06     Lab work is in process  Labs reviewed in EPIC  BP Readings from Last 3 Encounters:   11/18/24 120/79   11/15/24 118/64   10/25/24 110/60    Wt Readings from Last 3 Encounters:   11/18/24 84.4 kg (186 lb)   11/15/24 83.9 kg (185 lb)   10/25/24 84.4 kg (186 lb)                  Patient Active Problem List   Diagnosis    CARDIOVASCULAR SCREENING; LDL GOAL LESS THAN 160    Anemia    Atrial premature complex     Past Surgical History:   Procedure Laterality Date    LASIK  12/06       Social History     Tobacco Use    Smoking status: Never    Smokeless tobacco: Never   Substance Use Topics    Alcohol use: Yes     Alcohol/week: 1.0 - 2.0 standard drink of alcohol     Family History   Problem Relation Age of Onset    Neurologic Disorder Mother         Intermittent vertigo    Cancer Maternal Grandmother         skin    Respiratory Maternal Grandfather         Asbestos    Alcohol/Drug Paternal Grandfather         Alcohol    Hyperlipidemia Mother     Arthritis Mother     Skin Cancer Maternal Grandmother     LUNG DISEASE Maternal Grandfather         asbestosis    Heart Disease Paternal Grandmother     Alcoholism Paternal Grandfather     Atrial fibrillation Father     Atrial fibrillation Brother     Diabetes No family hx of          Current Outpatient Medications   Medication Sig Dispense Refill    acetaminophen (TYLENOL) 500 MG tablet Take 500-1,000 mg by mouth every 6 hours as needed for mild pain.       Allergies   Allergen Reactions    Green Pepper [Capsicum] Angioedema     Hives, angioedema    Erythromycin Base [Erythromycin] Unknown     Recent Labs   Lab Test 11/18/24  1534 10/19/20  1247 06/06/19  0806 05/31/18  0738   A1C 5.9*  --   --   --    LDL  --  148* 134*  "155*   HDL  --  44 45 45   TRIG  --  99 61 114          Review of Systems  Constitutional, neuro, ENT, endocrine, pulmonary, cardiac, gastrointestinal, genitourinary, musculoskeletal, integument and psychiatric systems are negative, except as otherwise noted.     Objective    Exam  /79 (BP Location: Left arm, Patient Position: Sitting, Cuff Size: Adult Regular)   Pulse 75   Temp 98.6  F (37  C) (Temporal)   Resp 20   Ht 1.835 m (6' 0.24\")   Wt 84.4 kg (186 lb)   SpO2 100%   BMI 25.06 kg/m     Estimated body mass index is 25.06 kg/m  as calculated from the following:    Height as of this encounter: 1.835 m (6' 0.24\").    Weight as of this encounter: 84.4 kg (186 lb).    Physical Exam  GENERAL: alert and no distress  EYES: Eyes grossly normal to inspection, PERRL and conjunctivae and sclerae normal  HENT: ear canals and TM's normal, nose and mouth without ulcers or lesions  NECK: no adenopathy, no asymmetry, masses, or scars  RESP: lungs clear to auscultation - no rales, rhonchi or wheezes  CV: regular rate and rhythm, normal S1 S2, no S3 or S4, no murmur, click or rub, no peripheral edema  ABDOMEN: soft, nontender, no hepatosplenomegaly, no masses and bowel sounds normal   (male): normal male genitalia, incisions from recent vasectomy healing well.  Appropriate edema.  MS: no gross musculoskeletal defects noted, no edema  SKIN: no suspicious lesions or rashes; a little thickening and buckling of his nail and it looks like it may have  from the nailbed more distally.  No concerning findings, redness, edema.  NEURO: Normal strength and tone, mentation intact and speech normal  PSYCH: mentation appears normal, affect normal/bright        Signed Electronically by: Bertha Garcia MD    "

## 2024-11-18 NOTE — PATIENT INSTRUCTIONS
Patient Education   Begin a probiotic such as Culturelle or Florastor daily for the next month to see if this makes a difference in your digestion and bowel habits.    Learning About the Low FODMAP Diet for Irritable Bowel Syndrome (IBS)  What is the low-FODMAP diet?  A low-FODMAP diet is used to find out if certain foods make irritable bowel syndrome (IBS) worse. You stop eating high-FODMAP foods for 2 to 6 weeks. Then you slowly add them back to see how your body reacts.  This is called an elimination diet. A dietitian or doctor can help you follow this diet.  FODMAPs are types of carbohydrates that can be hard for your body to digest. They are in many types of foods. FODMAP stands for:  F ermentable.  O ligosaccharides.  D isaccharides.  M onosaccharides.  A nd p olyols.  If you have IBS, foods that are high in FODMAPs may make your symptoms worse. When you are on this diet, you can still eat carbohydrates that are low in FODMAPs. This includes certain fruits, vegetables, grains, and low-lactose dairy products.  What is it used for?  This diet is used to help manage symptoms of irritable bowel syndrome (IBS). The diet limits foods that are high in FODMAPs.  High-FODMAP foods can be hard to digest. They pull more fluid into your intestines. They are also easily fermented. This can lead to bloating, belly pain, gas, and diarrhea.  The low-FODMAP diet can help you figure out what foods to avoid. And it can help you find foods that are easier to digest.  This diet can help with IBS symptoms. But it's not a cure. You will still need to manage your condition.  How does it work?  At first, you won't eat any high-FODMAP foods for a few weeks.  It can be helpful to work with a dietitian who is trained in the low-FODMAP diet when you try this diet. They can help you find recipes and FODMAP food lists to use while you are on the diet.  After 2 to 6 weeks, you will start to try high-FODMAP foods again. You will add those  "foods back to your diet, one at a time. Your doctor or dietitian will probably have you wait a few days before you add each new food.  Keep a food diary. You can write down the foods you try and note how they make you feel.  After a few weeks, you may have a better idea of what foods you should avoid and what foods you can eat without triggering IBS symptoms.  What are the risks?  There is some risk of not getting all of the vitamins and nutrients you need on the low-FODMAP diet. These include:  Folate.  Thiamin.  Vitamin B6.  Calcium.  Vitamin D.  Your dietitian or doctor can help you find other sources of these if needed.  This diet may limit your fiber intake. If you need more fiber, ask your doctor or dietitian about low-FODMAP fiber sources.  What foods are on the low-FODMAP diet?  Here is a guide to foods that you can eat, plus the foods that you should avoid, when you are on the low-FODMAP diet.  Grains  Okay to eat: Foods made from grains like arrowroot, buckwheat, cornmeal, millet, and oats. You can also eat potato flour, quinoa, rice, sorghum, tapioca, and teff. Cereals, pasta, breads, corn tortillas and baked goods made from these grains are also okay. (These grains may be labeled \"gluten-free.\")  Avoid: Grains like wheat, barley, and rye. Avoid ingredients such as bulgur, couscous, durum, and semolina. And avoid cereals, breads, and pastas made from these grains. Avoid chickpea, lentil, and pea flour.  Proteins  Okay to eat: Most meat, fish, and eggs without high-FODMAP sauces. You can have small amounts of almonds or hazelnuts (10 nuts). Macadamia nuts, peanuts, pecans, pine nuts, and walnuts are also okay. You can also eat nura and pumpkin seeds, tofu, and tempeh.  Avoid: Beans, chickpeas, lentils, and soybeans. Avoid pistachio and cashew nuts. Avoid fatty or fried meats. And some sausages may have high-FODMAP ingredients.  Dairy  Okay to eat: Lactose-free dairy milks. Rice milk and almond milk are " "okay. So are lactose-free yogurts, kefirs, ice creams, and sorbet from low-FODMAP fruits and sweeteners. (These are often labeled \"lactose-free.\") You can have small amounts (2 Tbsp) of cottage, cream, or ricotta cheese. Hard cheeses like cheddar, Domo, Parmesan, and Swiss are okay. So are small amounts (1 oz) of aged or ripened cheeses like Brie, blue, and feta.  Avoid: Milk, including cow, goat, and sheep. Avoid condensed or evaporated milk, buttermilk, custard, cream, sour cream, yogurt, and ice cream. Avoid soy milk. (Check sauces for dairy ingredients.)  Vegetables  Okay to eat: Bamboo shoots, bell peppers, bok corie, broccoli, cabbage (red or white), and cucumbers. Eggplant, green beans, lettuce, olives, parsnips, and potatoes are okay to eat. So are pumpkin, rutabaga, seaweed, sprouts, Swiss chard, and spinach. You can eat scallions (green part only) and yellow or spaghetti squash. You can eat tomatoes, turnips, watercress, yams, and zucchini. You can also have small amounts of artichoke hearts (from can, 1 oz), carrots, corn (  cob), and sweet potato (  cup).  Avoid: Artichokes, asparagus, [place] sprouts, sarwat cabbage, cauliflower, and celery. And avoid garlic, leeks, mushrooms, okra, onions, scallions (white part), shallots, and peas.  Fruits  Okay to eat: Bananas, blueberries, cantaloupe, grapes, and honeydew. Kiwi, marlin, limes, oranges, passion fruit, papaya, and pineapple are also okay. You can eat plantain, raspberries, rhubarb, star fruit, strawberries, tangelo, and tangerine. You can also have small amounts of dried banana chips (up to 10 chips), dried cranberries (1 Tbsp), and shredded coconut (up to   cup).  Avoid: Apples, applesauce, apricots, avocados, blackberries, boysenberries, and cherries. Also avoid dates, figs, grapefruit, guava, lychee, and mangoes. Don't eat nectarines, peaches, pears, persimmon, plums, prunes, tamarillo, or watermelon. And limit most canned and dried fruits.  Oils, " spices, condiments, and sweeteners  Okay to eat: Vegetable oils (including garlic infused), butter, ghee, lard, and margarine. You can have most fresh herbs like basil, chives, coriander, janene, parsley, rosemary, and thyme. You can have salt, jams made from low-FODMAP fruits, mayonnaise, and mustard. Soy sauce, hot sauce (no garlic), tamari, and vinegar are also okay. Sweeteners that are okay include sugar (sucrose), powdered (confectioner's) sugar, brown sugar, glucose, and maple syrup. You can also have some artificial sweeteners like aspartame, saccharine, and stevia.  Avoid: Chutneys, hummus, jellies, garlic sauces, and gravies made with onion or garlic. Avoid pickles, relish, some salad dressings and soup stocks, salsa, and tomato paste. And avoid sauces and other foods with high fructose corn syrup, honey, molasses, and agave. Avoid artificial sweeteners (isomalt, mannitol, malitol, sorbitol, and xylitol). Avoid corn syrup solids, fructose, fruit juice concentrate, and polydextrose.  Other foods and drinks  Okay to have: Water, soda water, tonic, soft drinks sweetened with sugar,   cup of low-FODMAP fruit juice, and most teas and alcohols. You can also eat foods made with baking powder and soda, cocoa, and gelatin.  Avoid: Juices from high-FODMAP fruits and vegetables. And avoid fortified shante, chamomile and fennel teas, chicory-based drinks and coffee substitutes, and bouillon cubes.  Follow-up care is a key part of your treatment and safety. Be sure to make and go to all appointments, and call your doctor if you are having problems. It's also a good idea to know your test results and keep a list of the medicines you take.  Current as of: October 24, 2023  Content Version: 14.2    2024 [facility] [employer].   Care instructions adapted under license by your healthcare professional. If you have questions about a medical condition or this instruction, always ask your healthcare professional. [employer] disclaims any warranty or liability for your use of this information.       Patient Education   Preventive Care Advice   This is general advice given by our system to help you stay healthy. However, your care team may have specific advice just for you. Please talk to your care team about your preventive care needs.  Nutrition  Eat 5 or more servings of fruits and vegetables each day.  Try wheat bread, brown rice and whole grain pasta (instead of white bread, rice, and pasta).  Get enough calcium and vitamin D. Check the label on foods and aim for 100% of the RDA (recommended daily allowance).  Lifestyle  Exercise at least 150 minutes each week  (30 minutes a day, 5 days a week).  Do muscle strengthening activities 2 days a week. These help control your weight and prevent disease.  No smoking.  Wear sunscreen to prevent skin cancer.  Have a dental exam and cleaning every 6 months.  Yearly exams  See your health care team every year to talk about:  Any changes in your health.  Any medicines your care team has prescribed.  Preventive care, family planning, and ways to prevent chronic diseases.  Shots (vaccines)   HPV shots (up to age 26), if you've never had them before.  Hepatitis B shots (up to age 59), if you've never had them before.  COVID-19 shot: Get this shot when it's due.  Flu shot: Get a flu shot every year.  Tetanus shot: Get a tetanus shot every 10 years.  Pneumococcal, hepatitis A, and RSV shots: Ask your care team if you need these based on your risk.  Shingles shot (for age 50 and up)  General health tests  Diabetes screening:  Starting at age 35, Get screened for diabetes at least every 3 years.  If you are younger than age 35, ask your care team if you should be screened for diabetes.  Cholesterol test: At age 39, start having a cholesterol test every 5 years, or more often if advised.  Bone density scan (DEXA): At age 50, ask your care team if you should have this scan for osteoporosis  (brittle bones).  Hepatitis C: Get tested at least once in your life.  STIs (sexually transmitted infections)  Before age 24: Ask your care team if you should be screened for STIs.  After age 24: Get screened for STIs if you're at risk. You are at risk for STIs (including HIV) if:  You are sexually active with more than one person.  You don't use condoms every time.  You or a partner was diagnosed with a sexually transmitted infection.  If you are at risk for HIV, ask about PrEP medicine to prevent HIV.  Get tested for HIV at least once in your life, whether you are at risk for HIV or not.  Cancer screening tests  Cervical cancer screening: If you have a cervix, begin getting regular cervical cancer screening tests starting at age 21.  Breast cancer scan (mammogram): If you've ever had breasts, begin having regular mammograms starting at age 40. This is a scan to check for breast cancer.  Colon cancer screening: It is important to start screening for colon cancer at age 45.  Have a colonoscopy test every 10 years (or more often if you're at risk) Or, ask your provider about stool tests like a FIT test every year or Cologuard test every 3 years.  To learn more about your testing options, visit:   .  For help making a decision, visit:   https://bit.ly/as67508.  Prostate cancer screening test: If you have a prostate, ask your care team if a prostate cancer screening test (PSA) at age 55 is right for you.  Lung cancer screening: If you are a current or former smoker ages 50 to 80, ask your care team if ongoing lung cancer screenings are right for you.  For informational purposes only. Not to replace the advice of your health care provider. Copyright   2023 [employer]. All rights reserved. Clinically reviewed by the [facility]. [employer] 482564 - REV 01/24.  Learning About Stress  What is stress?     Stress is your body's response to a hard situation. Your body can have a  physical, emotional, or mental response. Stress is a fact of life for most people, and it affects everyone differently. What causes stress for you may not be stressful for someone else.  A lot of things can cause stress. You may feel stress when you go on a job interview, take a test, or run a race. This kind of short-term stress is normal and even useful. It can help you if you need to work hard or react quickly. For example, stress can help you finish an important job on time.  Long-term stress is caused by ongoing stressful situations or events. Examples of long-term stress include long-term health problems, ongoing problems at work, or conflicts in your family. Long-term stress can harm your health.  How does stress affect your health?  When you are stressed, your body responds as though you are in danger. It makes hormones that speed up your heart, make you breathe faster, and give you a burst of energy. This is called the fight-or-flight stress response. If the stress is over quickly, your body goes back to normal and no harm is done.  But if stress happens too often or lasts too long, it can have bad effects. Long-term stress can make you more likely to get sick, and it can make symptoms of some diseases worse. If you tense up when you are stressed, you may develop neck, shoulder, or low back pain. Stress is linked to high blood pressure and heart disease.  Stress also harms your emotional health. It can make you putnam, tense, or depressed. Your relationships may suffer, and you may not do well at work or school.  What can you do to manage stress?  You can try these things to help manage stress:   Do something active. Exercise or activity can help reduce stress. Walking is a great way to get started. Even everyday activities such as housecleaning or yard work can help.  Try yoga or inocente chi. These techniques combine exercise and meditation. You may need some training at first to learn them.  Do something you  "enjoy. For example, listen to music or go to a movie. Practice your hobby or do volunteer work.  Meditate. This can help you relax, because you are not worrying about what happened before or what may happen in the future.  Do guided imagery. Imagine yourself in any setting that helps you feel calm. You can use online videos, books, or a teacher to guide you.  Do breathing exercises. For example:  From a standing position, bend forward from the waist with your knees slightly bent. Let your arms dangle close to the floor.  Breathe in slowly and deeply as you return to a standing position. Roll up slowly and lift your head last.  Hold your breath for just a few seconds in the standing position.  Breathe out slowly and bend forward from the waist.  Let your feelings out. Talk, laugh, cry, and express anger when you need to. Talking with supportive friends or family, a counselor, or a josé luis leader about your feelings is a healthy way to relieve stress. Avoid discussing your feelings with people who make you feel worse.  Write. It may help to write about things that are bothering you. This helps you find out how much stress you feel and what is causing it. When you know this, you can find better ways to cope.  What can you do to prevent stress?  You might try some of these things to help prevent stress:  Manage your time. This helps you find time to do the things you want and need to do.  Get enough sleep. Your body recovers from the stresses of the day while you are sleeping.  Get support. Your family, friends, and community can make a difference in how you experience stress.  Limit your news feed. Avoid or limit time on social media or news that may make you feel stressed.  Do something active. Exercise or activity can help reduce stress. Walking is a great way to get started.  Where can you learn more?  Go to https://www.healthwise.net/patiented  Enter N032 in the search box to learn more about \"Learning About " "Stress.\"  Current as of: October 24, 2023  Content Version: 14.2 2024 [facility] [employer], [facility].   Care instructions adapted under license by your healthcare professional. If you have questions about a medical condition or this instruction, always ask your healthcare professional. [employer] disclaims any warranty or liability for your use of this information.       "

## 2024-11-19 LAB
ALBUMIN SERPL BCG-MCNC: 4.5 G/DL (ref 3.5–5.2)
ALP SERPL-CCNC: 71 U/L (ref 40–150)
ALT SERPL W P-5'-P-CCNC: 21 U/L (ref 0–70)
ANION GAP SERPL CALCULATED.3IONS-SCNC: 12 MMOL/L (ref 7–15)
AST SERPL W P-5'-P-CCNC: 23 U/L (ref 0–45)
BILIRUB SERPL-MCNC: 0.6 MG/DL
BUN SERPL-MCNC: 10.4 MG/DL (ref 6–20)
CALCIUM SERPL-MCNC: 9.2 MG/DL (ref 8.8–10.4)
CHLORIDE SERPL-SCNC: 104 MMOL/L (ref 98–107)
CHOLEST SERPL-MCNC: 208 MG/DL
CREAT SERPL-MCNC: 0.94 MG/DL (ref 0.67–1.17)
EGFRCR SERPLBLD CKD-EPI 2021: >90 ML/MIN/1.73M2
FASTING STATUS PATIENT QL REPORTED: NO
FASTING STATUS PATIENT QL REPORTED: NO
GLUCOSE SERPL-MCNC: 98 MG/DL (ref 70–99)
HCO3 SERPL-SCNC: 26 MMOL/L (ref 22–29)
HDLC SERPL-MCNC: 40 MG/DL
LDLC SERPL CALC-MCNC: 101 MG/DL
NONHDLC SERPL-MCNC: 168 MG/DL
PATH REPORT.COMMENTS IMP SPEC: NORMAL
PATH REPORT.COMMENTS IMP SPEC: NORMAL
PATH REPORT.FINAL DX SPEC: NORMAL
PATH REPORT.GROSS SPEC: NORMAL
PATH REPORT.MICROSCOPIC SPEC OTHER STN: NORMAL
PATH REPORT.RELEVANT HX SPEC: NORMAL
PHOTO IMAGE: NORMAL
POTASSIUM SERPL-SCNC: 3.8 MMOL/L (ref 3.4–5.3)
PROT SERPL-MCNC: 7.2 G/DL (ref 6.4–8.3)
SODIUM SERPL-SCNC: 142 MMOL/L (ref 135–145)
TRIGL SERPL-MCNC: 336 MG/DL
TSH SERPL DL<=0.005 MIU/L-ACNC: 2.51 UIU/ML (ref 0.3–4.2)

## 2025-01-06 DIAGNOSIS — N50.82 SCROTAL PAIN: Primary | ICD-10-CM

## 2025-03-18 ENCOUNTER — LAB (OUTPATIENT)
Dept: LAB | Facility: CLINIC | Age: 50
End: 2025-03-18
Payer: COMMERCIAL

## 2025-03-18 DIAGNOSIS — Z30.2 ENCOUNTER FOR STERILIZATION: ICD-10-CM

## 2025-03-18 LAB — SEMEN ANALYSIS P VAS PNL: NORMAL

## 2025-03-18 PROCEDURE — 89321 SEMEN ANAL SPERM DETECTION: CPT
